# Patient Record
Sex: MALE | Race: WHITE | NOT HISPANIC OR LATINO | URBAN - NONMETROPOLITAN AREA
[De-identification: names, ages, dates, MRNs, and addresses within clinical notes are randomized per-mention and may not be internally consistent; named-entity substitution may affect disease eponyms.]

---

## 2017-07-13 PROBLEM — Z96.1: Noted: 2017-07-13

## 2017-07-13 PROBLEM — H52.13: Noted: 2019-10-22

## 2017-07-13 PROBLEM — H52.13: Noted: 2021-05-20

## 2017-07-13 PROBLEM — H52.4: Noted: 2019-10-22

## 2017-07-13 PROBLEM — H52.223: Noted: 2021-05-20

## 2017-07-13 PROBLEM — H35.361: Noted: 2021-05-20

## 2017-07-13 PROBLEM — H52.223: Noted: 2019-10-22

## 2017-07-13 PROBLEM — H04.123: Noted: 2021-05-20

## 2017-07-13 PROBLEM — H52.4: Noted: 2021-05-20

## 2018-01-15 NOTE — PATIENT DISCUSSION
EPIPHORA WITH OBSTRUCTED NLDO, OU: NON-CLEARING NASOLACRIMAL DUCT OBSTRUCTION AS NOTED ON PUNCTAL DILATION AND IRRIGATION. REFER TO PLASTICS FOR EVALUATION POSSIBLE DCR.

## 2018-01-15 NOTE — PATIENT DISCUSSION
New Prescription: Lotemax (loteprednol etabonate): gel: 0.5% 1 drop four times a day into both eyes 01-

## 2018-02-16 NOTE — PATIENT DISCUSSION
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 A intranasal approach for a dacryocystorhinostomy was planned. The purpose of the nasal endoscopy was to evaluate a surgical site for abnormalities which could alter the surgical course including turbinate hypertrophy or septal deviation. A Stortz 0 degree endoscope was placed intranasally on both sides and the following observations were noted: mild NSD to the right.

## 2018-02-16 NOTE — PATIENT DISCUSSION
Punctal Dilation and Irrigation: The patient had a history of chronic epiphoria on the right side. Punctal/Canalicular/Nasolacrimal Duct Obstruction was suspected. After informed consent a punctal dilator was placed in the affected punctum, which was dilated appropriately. A 1cc syringe filled with sterile eyewash with a blunt canula was prepared the blunt canula was inserted into the canalicular system. The result of the irrigation was as follows: complete reflux.

## 2018-02-16 NOTE — PATIENT DISCUSSION
Nasolacrimal Duct Obstruction Counseling:  I have examined the patient and discussed or attempted dilation and irrigation of the nasolacrimal system. Obstruction to normal irrigation is found. The anatomy and causation of this problem was explained to the patient in detail. The risks and benefits of a dacryocystorhinostomy was discussed in detail, including the need for placement of a Yin tubes for up to 6 months during the healing process. The patient understands and has had all questions answered and desires to proceed with the surgery as explained.

## 2018-04-11 NOTE — PATIENT DISCUSSION
Punctal Dilation and Irrigation: The patient had a history of chronic epiphoria on the left side. Punctal/Canalicular/Nasolacrimal Duct Obstruction was suspected. After informed consent a punctal dilator was placed in the affected punctum, which was dilated appropriately. A 1cc syringe filled with sterile eyewash with a blunt canula was prepared the blunt canula was inserted into the canalicular system. The result of the irrigation was as follows: significant reflux with partial blockage.

## 2018-04-11 NOTE — PATIENT DISCUSSION
NASOLACRIMAL DUCT OBSTRUCTION, OU:  SCHEDULE ENDOSCOPIC DCR, OU. PREVIOUS NOTE DOCUMENT SYMPTOMS OF RIGHT SIDE.

## 2019-10-22 ENCOUNTER — IMPORTED ENCOUNTER (OUTPATIENT)
Dept: URBAN - NONMETROPOLITAN AREA CLINIC 1 | Facility: CLINIC | Age: 84
End: 2019-10-22

## 2019-10-22 PROCEDURE — 92015 DETERMINE REFRACTIVE STATE: CPT

## 2019-10-22 PROCEDURE — 99213 OFFICE O/P EST LOW 20 MIN: CPT

## 2019-10-22 NOTE — PATIENT DISCUSSION
s/p PCIOL-Stable PCIOL s/p YAG Caps OU.-Monitor. Myopia/Astigmatism/Presbyopia -Discussed diagnosis with patient. -Explained that people who are myopic are at a higher risk for developing RD/RT and reviewed associated S&S.-Pt to contact our office if symptoms develop. Updated spec Rx given. Recommend lens that will provide comfort as well as protect safety and health of eyes. RTC 1 yr CEE

## 2021-05-20 ENCOUNTER — IMPORTED ENCOUNTER (OUTPATIENT)
Dept: URBAN - NONMETROPOLITAN AREA CLINIC 1 | Facility: CLINIC | Age: 86
End: 2021-05-20

## 2021-05-20 PROCEDURE — 92014 COMPRE OPH EXAM EST PT 1/>: CPT

## 2021-05-20 PROCEDURE — 92015 DETERMINE REFRACTIVE STATE: CPT

## 2022-04-09 ASSESSMENT — TONOMETRY
OD_IOP_MMHG: 15
OS_IOP_MMHG: 15
OS_IOP_MMHG: 13
OD_IOP_MMHG: 12

## 2022-04-09 ASSESSMENT — VISUAL ACUITY
OS_CC: J1+
OD_SC: 20/20
OU_SC: 20/25
OS_SC: 20/30-1
OD_SC: 20/30-1
OS_SC: 20/20
OD_CC: J1+

## 2022-04-21 NOTE — PATIENT DISCUSSION
Educated patient on findings. 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 Prescribe doxycycline 100mg BID po x 7 days, erythromycin polly qhs OU, warm compresses BID-TID OU, and artificial tears BID-QID OU. Remain out of CLs until si/sx resolve. Advised to call/RTC if si/sx persist or worsen. Monitor.

## 2022-08-15 ENCOUNTER — ESTABLISHED PATIENT (OUTPATIENT)
Dept: RURAL CLINIC 2 | Facility: CLINIC | Age: 87
End: 2022-08-15

## 2022-08-15 DIAGNOSIS — H43.813: ICD-10-CM

## 2022-08-15 DIAGNOSIS — H52.13: ICD-10-CM

## 2022-08-15 DIAGNOSIS — H52.4: ICD-10-CM

## 2022-08-15 DIAGNOSIS — H04.223: ICD-10-CM

## 2022-08-15 DIAGNOSIS — Z96.1: ICD-10-CM

## 2022-08-15 PROCEDURE — 92014 COMPRE OPH EXAM EST PT 1/>: CPT

## 2022-08-15 PROCEDURE — 92015 DETERMINE REFRACTIVE STATE: CPT

## 2022-08-15 ASSESSMENT — VISUAL ACUITY
OU_CC: 20/30
OS_CC: 20/20
OD_CC: 20/25-1

## 2022-08-15 ASSESSMENT — TONOMETRY
OD_IOP_MMHG: 14
OS_IOP_MMHG: 14

## 2022-08-15 NOTE — PATIENT DISCUSSION
Suspect drainage problems; bothersome enough to try LOTEMAX SM QAM OU.  Sample given and Rx sent to [facility].

## 2022-10-10 ENCOUNTER — ESTABLISHED PATIENT (OUTPATIENT)
Dept: RURAL CLINIC 2 | Facility: CLINIC | Age: 87
End: 2022-10-10

## 2022-10-10 DIAGNOSIS — H43.813: ICD-10-CM

## 2022-10-10 DIAGNOSIS — Z96.1: ICD-10-CM

## 2022-10-10 DIAGNOSIS — H04.223: ICD-10-CM

## 2022-10-10 PROCEDURE — 99213 OFFICE O/P EST LOW 20 MIN: CPT

## 2022-10-10 ASSESSMENT — VISUAL ACUITY
OD_CC: 20/25+
OS_CC: 20/20

## 2022-10-10 ASSESSMENT — TONOMETRY
OD_IOP_MMHG: 16
OS_IOP_MMHG: 15

## 2022-10-10 NOTE — PATIENT DISCUSSION
Suspect drainage problems; normal puncta and no ectropion; discussed option of monitoring or referral to oculoplastics for irrigation and probing.  Defers referral at this time.  Finish supply of LOTEMAX SM but do not refill.

## 2022-12-20 NOTE — PATIENT DISCUSSION
Continue oral doxy as directed, continue e-mycin polly as directed. Ice pack/cool compresses warranted. RTC 1 day for progress check.

## 2022-12-21 NOTE — PATIENT DISCUSSION
Continue oral doxy as directed, continue e-mycin polly as directed. Ice pack/cool compresses warranted. Continue care in place with Dr. Liudmila Stacy. OR Nurse

## 2023-01-06 NOTE — PATIENT DISCUSSION
Educated patient on findings. Continue antibiotic treatment prescribed by Dr. Hanh Yeung and begin warm compresses with gentle massage BID/prn. Refer to Dr. Amanda Yanez for evaluation and treatment. Monitor.

## 2023-01-06 NOTE — PATIENT DISCUSSION
Continue oral doxy as directed, continue e-mycin polly as directed. Ice pack/cool compresses warranted. Continue care in place with Dr. Jimy Gil.

## 2023-01-10 NOTE — PATIENT DISCUSSION
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  Explained the diagnosis in detail with the patient today.  Discussed the r/b of a surgical procedure in order to help alleviate her symptoms and treat the chronic infection.  Patient expressed understanding and will follow up for surgery at her convenience.

## 2023-04-10 ENCOUNTER — ESTABLISHED PATIENT (OUTPATIENT)
Dept: RURAL CLINIC 2 | Facility: CLINIC | Age: 88
End: 2023-04-10

## 2023-04-10 DIAGNOSIS — H04.223: ICD-10-CM

## 2023-04-10 DIAGNOSIS — H43.813: ICD-10-CM

## 2023-04-10 DIAGNOSIS — Z96.1: ICD-10-CM

## 2023-04-10 DIAGNOSIS — H52.4: ICD-10-CM

## 2023-04-10 DIAGNOSIS — H52.223: ICD-10-CM

## 2023-04-10 DIAGNOSIS — H52.13: ICD-10-CM

## 2023-04-10 PROCEDURE — 92015 DETERMINE REFRACTIVE STATE: CPT

## 2023-04-10 PROCEDURE — 92014 COMPRE OPH EXAM EST PT 1/>: CPT

## 2023-04-10 ASSESSMENT — VISUAL ACUITY
OU_CC: 20/25-2
OU_CC: 20/20
OD_CC: 20/25-1
OS_CC: 20/20

## 2023-04-10 ASSESSMENT — TONOMETRY
OD_IOP_MMHG: 16
OS_IOP_MMHG: 16

## 2024-04-11 ENCOUNTER — ESTABLISHED PATIENT (OUTPATIENT)
Dept: RURAL CLINIC 2 | Facility: CLINIC | Age: 89
End: 2024-04-11

## 2024-04-11 DIAGNOSIS — H52.223: ICD-10-CM

## 2024-04-11 DIAGNOSIS — H43.813: ICD-10-CM

## 2024-04-11 DIAGNOSIS — H52.13: ICD-10-CM

## 2024-04-11 DIAGNOSIS — H52.4: ICD-10-CM

## 2024-04-11 PROCEDURE — 92014 COMPRE OPH EXAM EST PT 1/>: CPT

## 2024-04-11 PROCEDURE — 92015 DETERMINE REFRACTIVE STATE: CPT

## 2024-04-11 PROCEDURE — 92134 CPTRZ OPH DX IMG PST SGM RTA: CPT

## 2024-04-11 ASSESSMENT — TONOMETRY
OS_IOP_MMHG: 15
OD_IOP_MMHG: 16

## 2024-04-11 ASSESSMENT — VISUAL ACUITY
OD_CC: 20/25-1
OS_CC: 20/20-1

## 2025-05-07 ENCOUNTER — COMPREHENSIVE EXAM (OUTPATIENT)
Age: OVER 89
End: 2025-05-07

## 2025-05-07 DIAGNOSIS — H43.813: ICD-10-CM

## 2025-05-07 DIAGNOSIS — H52.4: ICD-10-CM

## 2025-05-07 DIAGNOSIS — Z96.1: ICD-10-CM

## 2025-05-07 DIAGNOSIS — H52.223: ICD-10-CM

## 2025-05-07 DIAGNOSIS — H35.013: ICD-10-CM

## 2025-05-07 DIAGNOSIS — H04.223: ICD-10-CM

## 2025-05-07 DIAGNOSIS — H52.13: ICD-10-CM

## 2025-05-07 PROCEDURE — 92015 DETERMINE REFRACTIVE STATE: CPT

## 2025-05-07 PROCEDURE — 92014 COMPRE OPH EXAM EST PT 1/>: CPT
